# Patient Record
Sex: FEMALE | Race: BLACK OR AFRICAN AMERICAN | NOT HISPANIC OR LATINO | Employment: STUDENT | URBAN - METROPOLITAN AREA
[De-identification: names, ages, dates, MRNs, and addresses within clinical notes are randomized per-mention and may not be internally consistent; named-entity substitution may affect disease eponyms.]

---
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
## 2025-06-24 ENCOUNTER — OFFICE VISIT (OUTPATIENT)
Dept: URGENT CARE | Facility: CLINIC | Age: 25
End: 2025-06-24
Payer: COMMERCIAL

## 2025-06-24 VITALS
DIASTOLIC BLOOD PRESSURE: 70 MMHG | HEART RATE: 96 BPM | OXYGEN SATURATION: 97 % | SYSTOLIC BLOOD PRESSURE: 108 MMHG | RESPIRATION RATE: 18 BRPM | TEMPERATURE: 97.6 F | BODY MASS INDEX: 19.04 KG/M2 | HEIGHT: 60 IN | WEIGHT: 97 LBS

## 2025-06-24 DIAGNOSIS — S61.210A LACERATION OF RIGHT INDEX FINGER WITHOUT FOREIGN BODY WITHOUT DAMAGE TO NAIL, INITIAL ENCOUNTER: Primary | ICD-10-CM

## 2025-06-24 PROCEDURE — 99202 OFFICE O/P NEW SF 15 MIN: CPT | Performed by: PHYSICIAN ASSISTANT

## 2025-06-24 PROCEDURE — 90715 TDAP VACCINE 7 YRS/> IM: CPT

## 2025-06-24 PROCEDURE — 90471 IMMUNIZATION ADMIN: CPT | Performed by: PHYSICIAN ASSISTANT

## 2025-06-24 PROCEDURE — 12001 RPR S/N/AX/GEN/TRNK 2.5CM/<: CPT | Performed by: PHYSICIAN ASSISTANT

## 2025-06-24 NOTE — PROGRESS NOTES
[facility]    NAME: Madison Minaya is a 24 y.o. female  : 2000    MRN: [id number]  DATE: 2025  TIME: 11:33 AM    Assessment and Plan   Laceration of right index finger without foreign body without damage to nail, initial encounter [S61.210A]  1. Laceration of right index finger without foreign body without damage to nail, initial encounter  TDAP VACCINE GREATER THAN OR EQUAL TO 8YO IM        Nurse administered Tdap vaccine.      Patient Instructions     Patient Instructions   Patient Education     Laceration Repair With [facility]  General Information   You [facility] to the [facility] for a cut in your skin. Doctors / physician assistant closed the cut on your skin with a special kind of skin glue. In a week or so, the skin glue will fall off on its own.  What care is needed at home?   Call your regular doctor to let them know you were in the ED. Make a follow-up appointment if you were told to.  Do not pick at the skin glue. It will fall off on its own in 5 to 10 days.  Keep your wound clean and dry for the first 24 hours. After 24 hours, you can gently wash the wound with soap and water or take a shower. Gently pat the wound dry. Do not soak your wound by bathing or swimming.  Do not use an antibiotic ointment on the skin glue. If you want, you can cover your wound with a bandage. You can also leave it open to air if you prefer.  Wash your hands before and after you touch your wound or bandage.  If you still have skin glue in place after 10 days, you can use petroleum jelly or antibiotic ointment to loosen it.  Avoid activities that could hurt the area of your wound for a few weeks. If you hurt the same part of your body again, the wound can open up.  When do I need to get emergency help?   Go to the ED if:   You have a fever of 100.4°F (38°C) or higher or chills.  Your skin around the wound is swollen, red, or warm.  Your wound has thick yellow or green drainage.  The wound opens up.  When  do I need to call the doctor?   You have new or worsening symptoms.  Last Reviewed Date   2021-02-16  Consumer Information Use and Disclaimer   This generalized information is a limited summary of diagnosis, treatment, and/or medication information. It is not meant to be comprehensive and should be used as a tool to help the user understand and/or assess potential diagnostic and treatment options. It does NOT include all information about conditions, treatments, medications, side effects, or risks that may apply to a specific patient. It is not intended to be medical advice or a substitute for the medical advice, diagnosis, or treatment of a health care provider based on the health care provider's examination and assessment of a patient’s specific and unique circumstances. Patients must speak with a health care provider for complete information about their health, medical questions, and treatment options, including any risks or benefits regarding use of medications. This information does not endorse any treatments or medications as safe, effective, or approved for treating a specific patient. Up[employer]Date, Inc[employer] and its affiliates disclaim any warranty or liability relating to this information or the use thereof. The use of this information is governed by the Terms of Use, available at https://www.[employer]/en/know/clinical-effectiveness-terms   Copyright   Copyright © 2024 Up[employer]Date, Inc[employer] and its affiliates and/or licensors. All rights reserved.      Chief Complaint     Chief Complaint   Patient presents with    Finger Laceration     Cut her right index finger with a knife while cooking an hour ago.        History of Present Illness   Madison Minaya presents to the clinic c/o  Laceration right index finger    Right-hand-dominant  Cutting potatoes in kitchen and cut her index finger.  Bleeding.    Last Tdap unknown.      Finger Laceration  Pertinent negatives include no chills, fatigue, fever or numbness.        Review of Systems   Review of Systems   Constitutional:  Positive for activity change. Negative for appetite change, chills, fatigue and fever.   Skin:  Positive for wound.   Neurological:  Negative for numbness.       Current Medications   Long-Term Medications[1]    Current Allergies     Allergies as of 06/24/2025    (No Known Allergies)          The following portions of the patient's history were reviewed and updated as appropriate: allergies, current medications, past family history, past medical history, past social history, past surgical history and problem list.  Past Medical History[2]  Past Surgical History[3]  Family History[4]    Objective   /70   Pulse 96   Temp 97.6 °F (36.4 °C) (Tympanic)   Resp 18   Ht 5' (1.524 m)   Wt 44 kg (97 lb)   SpO2 97%   BMI 18.94 kg/m²   No LMP recorded.       Physical Exam     Physical Exam  Vitals and nursing note reviewed.   Constitutional:       General: She is not in acute distress.     Appearance: She is well-developed. She is not ill-appearing, toxic-appearing or diaphoretic.      Comments: Holding dressing around right index finger.     Cardiovascular:      Rate and Rhythm: Normal rate.   Pulmonary:      Effort: Pulmonary effort is normal. No respiratory distress.     Skin:     General: Skin is warm.      Findings: Lesion present.     Neurological:      Mental Status: She is alert and oriented to person, place, and time.     Psychiatric:         Mood and Affect: Mood normal.         Behavior: Behavior normal.         Universal Protocol:  procedure performed by consultantConsent: Verbal consent obtained  Consent given by: patient  Patient understanding: patient states understanding of the procedure being performed  Laceration repair    Date/Time: 6/24/2025 11:00 AM    Performed by: Lynne Turner PA-C  Authorized by: Lynne Turner PA-C  Body area: upper extremity  Location details: right index finger  Laceration length: 0.8 cm  Foreign bodies: no  foreign bodies  Tendon involvement: none  Nerve involvement: none  Vascular damage: no      Procedure Details:  Preparation: Patient was prepped and draped in the usual sterile fashion.  Irrigation solution: saline  Irrigation method: syringe  Amount of cleaning: standard  Skin closure: glue (exofin glue)  Approximation: close  Approximation difficulty: simple  Dressing: pressure dressing  Patient tolerance: patient tolerated the procedure well with no immediate complications                   [1]   Long-Term Medications   Medication Sig Dispense Refill    clotrimazole-betamethasone (LOTRISONE) 1-0.05 % lotion Apply topically 2 (two) times a day for 7 days 30 mL 0    Multiple Vitamin (MULTIVITAMIN) tablet Take 1 tablet by mouth daily     [2]   Past Medical History:  Diagnosis Date    Adopted person     adopted at age 2 months old    Environmental allergies     last summer reacted to bug bite with swelling,,,benadryl cleared it    Seizures (HCC)     at age 1.5 years once    Urinary tract infection     Wears glasses    [3]   Past Surgical History:  Procedure Laterality Date    FRENULECTOMY, LINGUAL      before braces    [facility] RPR UMBILICAL HRNA 5 YRS/> REDUCIBLE N/A 7/12/2017    Procedure: REPAIR HERNIA UMBILICAL;  Surgeon: Samantha White MD;  Location: [facility];  Service: General   [4]   Family History  Problem Relation Name Age of Onset    No Known Problems Mother      No Known Problems Father

## 2025-06-24 NOTE — PATIENT INSTRUCTIONS
Patient Education     Laceration Repair With [facility]  General Information   You came to the [facility] for a cut in your skin. Doctors / physician assistant closed the cut on your skin with a special kind of skin glue. In a week or so, the skin glue will fall off on its own.  What care is needed at home?   Call your regular doctor to let them know you were in the ED. Make a follow-up appointment if you were told to.  Do not pick at the skin glue. It will fall off on its own in 5 to 10 days.  Keep your wound clean and dry for the first 24 hours. After 24 hours, you can gently wash the wound with soap and water or take a shower. Gently pat the wound dry. Do not soak your wound by bathing or swimming.  Do not use an antibiotic ointment on the skin glue. If you want, you can cover your wound with a bandage. You can also leave it open to air if you prefer.  Wash your hands before and after you touch your wound or bandage.  If you still have skin glue in place after 10 days, you can use petroleum jelly or antibiotic ointment to loosen it.  Avoid activities that could hurt the area of your wound for a few weeks. If you hurt the same part of your body again, the wound can open up.  When do I need to get emergency help?   Go to the ED if:   You have a fever of 100.4°F (38°C) or higher or chills.  Your skin around the wound is swollen, red, or warm.  Your wound has thick yellow or green drainage.  The wound opens up.  When do I need to call the doctor?   You have new or worsening symptoms.  Last Reviewed Date   2021-02-16  Consumer Information Use and Disclaimer   This generalized information is a limited summary of diagnosis, treatment, and/or medication information. It is not meant to be comprehensive and should be used as a tool to help the user understand and/or assess potential diagnostic and treatment options. It does NOT include all information about conditions, treatments, medications, side effects, or risks that  may apply to a specific patient. It is not intended to be medical advice or a substitute for the medical advice, diagnosis, or treatment of a health care provider based on the health care provider's examination and assessment of a patient’s specific and unique circumstances. Patients must speak with a health care provider for complete information about their health, medical questions, and treatment options, including any risks or benefits regarding use of medications. This information does not endorse any treatments or medications as safe, effective, or approved for treating a specific patient. Up[employer]Date, Inc. and its affiliates disclaim any warranty or liability relating to this information or the use thereof. The use of this information is governed by the Terms of Use, available at https://www.[employer].com/en/know/clinical-effectiveness-terms   Copyright   Copyright © 2024 [employer], [employer]. and its affiliates and/or licensors. All rights reserved.

## (undated) DEVICE — TUBING SUCTION 5MM X 12 FT

## (undated) DEVICE — GLOVE SRG BIOGEL 6.5

## (undated) DEVICE — GLOVE INDICATOR PI UNDERGLOVE SZ 6.5 BLUE

## (undated) DEVICE — SUT MONOCRYL 4-0 PS-2 27 IN Y426H

## (undated) DEVICE — GLOVE INDICATOR PI UNDERGLOVE SZ 7 BLUE

## (undated) DEVICE — 3M™ STERI-STRIP™ COMPOUND BENZOIN TINCTURE 40 BAGS/CARTON 4 CARTONS/CASE C1544: Brand: 3M™ STERI-STRIP™

## (undated) DEVICE — SCD SEQUENTIAL COMPRESSION COMFORT SLEEVE MEDIUM KNEE LENGTH: Brand: KENDALL SCD

## (undated) DEVICE — REM POLYHESIVE ADULT PATIENT RETURN ELECTRODE: Brand: VALLEYLAB

## (undated) DEVICE — STRL UNIVERSAL MINOR GENERAL: Brand: CARDINAL HEALTH

## (undated) DEVICE — NEEDLE 25G X 1 1/2

## (undated) DEVICE — SUT VICRYL 3-0 SH 27 IN J416H

## (undated) DEVICE — CHLORAPREP HI-LITE 26ML ORANGE

## (undated) DEVICE — MEDI-VAC YANK SUCT HNDL W/TPRD BULBOUS TIP: Brand: CARDINAL HEALTH

## (undated) DEVICE — SUT VICRYL 2-0 SH 27 IN UNDYED J417H

## (undated) DEVICE — SUT PROLENE 1 CT-1 30 IN 8425H

## (undated) DEVICE — 3M™ STERI-STRIP™ REINFORCED ADHESIVE SKIN CLOSURES, R1547, 1/2 IN X 4 IN (12 MM X 100 MM), 6 STRIPS/ENVELOPE: Brand: 3M™ STERI-STRIP™

## (undated) DEVICE — BINDER ABDOMINAL 30-45 IN

## (undated) DEVICE — GAUZE SPONGES,16 PLY: Brand: CURITY

## (undated) DEVICE — DRESSING MEPILEX AG BORDER 4 X 4 IN

## (undated) DEVICE — INTENDED FOR TISSUE SEPARATION, AND OTHER PROCEDURES THAT REQUIRE A SHARP SURGICAL BLADE TO PUNCTURE OR CUT.: Brand: BARD-PARKER SAFETY BLADES SIZE 15, STERILE

## (undated) DEVICE — 2963 MEDIPORE SOFT CLOTH TAPE 3 IN X 10 YD 12 RLS/CS: Brand: 3M™ MEDIPORE™

## (undated) DEVICE — PLUMEPEN PRO 10FT